# Patient Record
Sex: FEMALE | Race: WHITE | NOT HISPANIC OR LATINO | Employment: PART TIME | URBAN - NONMETROPOLITAN AREA
[De-identification: names, ages, dates, MRNs, and addresses within clinical notes are randomized per-mention and may not be internally consistent; named-entity substitution may affect disease eponyms.]

---

## 2021-08-10 ENCOUNTER — OFFICE VISIT (OUTPATIENT)
Dept: FAMILY MEDICINE | Facility: OTHER | Age: 26
End: 2021-08-10
Attending: NURSE PRACTITIONER
Payer: COMMERCIAL

## 2021-08-10 VITALS
HEIGHT: 62 IN | OXYGEN SATURATION: 98 % | BODY MASS INDEX: 25.03 KG/M2 | WEIGHT: 136 LBS | DIASTOLIC BLOOD PRESSURE: 74 MMHG | RESPIRATION RATE: 18 BRPM | SYSTOLIC BLOOD PRESSURE: 124 MMHG | HEART RATE: 74 BPM | TEMPERATURE: 98.6 F
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
## 2021-08-10 PROCEDURE — 99202 OFFICE O/P NEW SF 15 MIN: CPT | Performed by: NURSE PRACTITIONER

## 2021-08-10 RX ORDER — SERTRALINE HYDROCHLORIDE 25 MG/1
TABLET, FILM COATED ORAL
COMMUNITY

## 2021-08-10 ASSESSMENT — MIFFLIN-ST. JEOR: SCORE: 1315.14

## 2021-08-10 ASSESSMENT — PAIN SCALES - GENERAL: PAINLEVEL: NO PAIN (0)

## 2021-08-11 NOTE — PROGRESS NOTES
ASSESSMENT/PLAN:    I have reviewed the nursing notes.  I have reviewed the findings, diagnosis, plan and need for follow up with the patient.    1. Bug bite, initial encounter  -reassured patient no signs of cellulitis are present. Follow up if you become febrile, if you notice worsening redness, if the site becomes warmth/hot to touch, foul drainage from the site, or if there is tenderness.   -apply hydrocortisone cream for its take antihistamines also if desired.     Discussed warning signs/symptoms indicative of need to f/u    Follow up if symptoms persist or worsen or concerns    I explained my diagnostic considerations and recommendations to the patient, who voiced understanding and agreement with the treatment plan. All questions were answered. We discussed potential side effects of any prescribed or recommended therapies, as well as expectations for response to treatments.    Nighat John NP  8/10/2021  7:56 PM    HPI:  Jaqueline Pastor is a 25 year old female who presents to [facility] today for concerns of swelling, itching, redness which is spreading to back of right thigh surrounding a bug bite. Denies warmth to touch, no fever/chills, no pain or tenderness to the site. Is mostly itchy. She outlined the redness which is not raised but does not seem to go away. This happened about 4 days ago. It is not hard to touch. She is unsure if she should be concerned.     No past medical history on file.  No past surgical history on file.  Social History     Tobacco Use     Smoking status: Former Smoker     Smokeless tobacco: Never Used   Substance Use Topics     Alcohol use: Yes     Current Outpatient Medications   Medication Sig Dispense Refill     MULTIPLE VITAMIN PO        sertraline (ZOLOFT) 25 MG tablet sertraline 25 mg tablet   TAKE 1 TABLET BY MOUTH DAILY       No Known Allergies  Past medical history, past surgical history, current medications and allergies reviewed and accurate to the best of my  "knowledge.      ROS:  Refer to HPI    /74 (BP Location: Left arm, Patient Position: Sitting, Cuff Size: Adult Regular)   Pulse 74   Temp 98.6  F (37  C) (Tympanic)   Resp 18   Ht 1.575 m (5' 2\")   Wt 61.7 kg (136 lb)   LMP  (LMP Unknown)   SpO2 98%   Breastfeeding No   BMI 24.87 kg/m      EXAM:  General Appearance: Well appearing 25 year old female, appropriate appearance for age. No acute distress  Dermatological: posterior right thigh, there is a pink area measuring approximately 5 cm in diameter. It is not warm, red, or tender. No drainage. No firmness or raised to touch.  Psychological: normal affect, alert, oriented, and pleasant.     "

## 2021-08-11 NOTE — NURSING NOTE
Patient presents to clinic experiencing swelling, itching, redness which is spreading to back of right thigh.  Medication Reconciliation: complete    Lola Goldstein LPN